# Patient Record
Sex: FEMALE | Race: WHITE
[De-identification: names, ages, dates, MRNs, and addresses within clinical notes are randomized per-mention and may not be internally consistent; named-entity substitution may affect disease eponyms.]

---

## 2023-05-19 ENCOUNTER — HOSPITAL ENCOUNTER (OUTPATIENT)
Dept: HOSPITAL 33 - SDC | Age: 58
Discharge: HOME | End: 2023-05-19
Attending: FAMILY MEDICINE
Payer: COMMERCIAL

## 2023-05-19 VITALS — HEART RATE: 57 BPM | OXYGEN SATURATION: 100 % | DIASTOLIC BLOOD PRESSURE: 62 MMHG | SYSTOLIC BLOOD PRESSURE: 114 MMHG

## 2023-05-19 DIAGNOSIS — K52.9: ICD-10-CM

## 2023-05-19 DIAGNOSIS — Z12.11: Primary | ICD-10-CM

## 2023-05-19 DIAGNOSIS — K57.30: ICD-10-CM

## 2023-05-19 NOTE — OP
SURGERY DATE:    05/19/2023



SURGERY TIME:      0740



PREOPERATIVE DIAGNOSIS:    

1.  SCREENING EXAM.



POSTOPERATIVE DIAGNOSIS:    

1.  MILD COLITIS AND SCATTERED DIVERTICULA, OTHERWISE NORMAL COLON.



PROCEDURE:    

1.  Colonoscopy.



SURGEON:    Dr. Altamirano.



ANESTHESIA:    MAC. Medications given by the Anesthesia Department.



BRIEF HISTORY:    The patient is a 56 y/o WF presenting now for screening colonoscopy. She 
reports she had one 10 years previously and she has had no problems found at that time. 
The patient was felt to need to have screening endoscopy at this point. She was appraised 
of the risks of the procedure including the risk of perforation, phlebitis, untoward 
reaction to medication, bleeding, and missed lesions.  The patient verbalized her 
understanding and desired to have the procedure performed.



DESCRIPTION OF PROCEDURE:    The patient was given the medications by the Anesthesia 
Department. She had continuous pulse oximetry, ECG monitoring, and intermittent BP 
monitoring during the examination. She was placed in the left lateral decubitus position. 
A digital rectal examination was performed and revealed normal anal sphincter tone and no 
masses.  The flexible Olympus pediatric colonoscope was used to intubate the rectum.  A 
view of the colon was developed sequentially to the cecum.  Upon insertion and withdrawal, 
including a retroflex view in the rectum, was noted to be areas of mild colitis as 
demonstrated by some clear mucus in scattered areas of the colon. There were also noted to 
be scattered diverticula. No other mucosal lesions being encountered, the scope was 
removed from the patient who tolerated the procedure well and was sent back to OP recovery 
in good condition.